# Patient Record
Sex: FEMALE | Race: BLACK OR AFRICAN AMERICAN | NOT HISPANIC OR LATINO | URBAN - METROPOLITAN AREA
[De-identification: names, ages, dates, MRNs, and addresses within clinical notes are randomized per-mention and may not be internally consistent; named-entity substitution may affect disease eponyms.]

---

## 2025-07-06 ENCOUNTER — TELEMEDICINE (OUTPATIENT)
Dept: URGENT CARE | Age: 60
End: 2025-07-06
Payer: OTHER GOVERNMENT
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
## 2025-07-06 RX ORDER — TRIAMCINOLONE ACETONIDE 5 MG/G
OINTMENT TOPICAL 2 TIMES DAILY
Qty: 15 G | Refills: 0 | Status: SHIPPED | OUTPATIENT
Start: 2025-07-06 | End: 2025-07-13

## 2025-07-06 NOTE — PROGRESS NOTES
Urgent Care Virtual Video Visit    Patient Location: [place]  Provider Location: [facility]    I have communicated my name and active licensure. Video visit completed with realtime synchronous video/audio connection. Informed consent was obtained from the patient. Patient was made aware that my evaluation and diagnosis are limited due to the fact that we are not in the same room during the interview and that this is a virtual encounter that took place via videoconferencing. Patient verbalized understanding.     Patient disposition: Home    Patient presents with several days of bug bites on right ankle.  Have been inflamed.  No drainage.  Has applied Neosporin and over-the-counter cream with no significant improvement of symptoms.  No history of eczema or sensitive skin.  Have not spread but have increased in size.    Telemedicine visit: Patient in no visible distress.  Normal mood and affect.  Right ankle with 4 inflamed bug bites.  Does not appear cellulitic.  No drainage.    Electronically signed by Lizandro Ivory DO  1:37 PM

## 2025-07-06 NOTE — PATIENT INSTRUCTIONS
Use a cool damp cloth on your skin to reduce the itchiness.  Take cold showers to avoid itchy skin.  Avoid itching or scratching your skin as this can cause infection.  Keep your skin moisturized.  Medications that can be placed to reduce the itching, Benadryl cream, calamine lotion.    Apply the prescribed medicated cream to the area, as directed    Follow-up in person if symptoms not start improving within the next several days.